# Patient Record
Sex: FEMALE
[De-identification: names, ages, dates, MRNs, and addresses within clinical notes are randomized per-mention and may not be internally consistent; named-entity substitution may affect disease eponyms.]

---

## 2018-05-07 ENCOUNTER — HOSPITAL ENCOUNTER (EMERGENCY)
Dept: HOSPITAL 42 - ED | Age: 23
Discharge: HOME | End: 2018-05-07
Payer: COMMERCIAL

## 2018-05-07 VITALS
RESPIRATION RATE: 18 BRPM | DIASTOLIC BLOOD PRESSURE: 67 MMHG | HEART RATE: 89 BPM | OXYGEN SATURATION: 99 % | SYSTOLIC BLOOD PRESSURE: 128 MMHG

## 2018-05-07 VITALS — TEMPERATURE: 99.5 F

## 2018-05-07 VITALS — BODY MASS INDEX: 33.1 KG/M2

## 2018-05-07 DIAGNOSIS — F17.210: ICD-10-CM

## 2018-05-07 DIAGNOSIS — J02.9: ICD-10-CM

## 2018-05-07 DIAGNOSIS — J45.909: Primary | ICD-10-CM

## 2018-05-07 NOTE — ED PDOC
Arrival/HPI





- General


Chief Complaint: Cough, Cold, Congestion


Time Seen by Provider: 05/07/18 19:06


Historian: Patient





- History of Present Illness


Narrative History of Present Illness (Text): 


05/07/18 19:06


A 22 year old female, whose past medical history includes bronchial asthma, 

presents to the emergency department complaining of nasal congestion, cough, 

and sore throat. Patient reports having ran out of inhaler medication. Patient 

denies any fever, chest pain, or any other complaints. 





No PMD








Past Medical History





- Provider Review


Nursing Documentation Reviewed: Yes





- Travel History


If Yes, travel location?: [place]





- Infectious Disease


Hx of Infectious Diseases: None





- Tetanus Immunization


Tetanus Immunization: Unknown





- Cardiac


Hx Cardiac Disorders: No





- Pulmonary


Hx Respiratory Disorders: Yes


Hx Asthma: Yes


Hx Bronchitis: Yes





- Neurological


Hx Neurological Disorder: No





- HEENT


Hx HEENT Disorder: No





- Renal


Hx Renal Disorder: No





- Endocrine/Metabolic


Hx Endocrine Disorders: No





- Hematological/Oncological


Hx Blood Disorders: No





- Integumentary


Hx Dermatological Disorder: No





- Musculoskeletal/Rheumatological


Hx Musculoskeletal Disorders: No





- Gastrointestinal


Hx Gastrointestinal Disorders: No





- Genitourinary/Gynecological


Hx Genitourinary Disorders: Yes


Other/Comment: bladder infection, chlamydia





- Psychiatric


Hx Psychophysiologic Disorder: No


Hx Substance Use: Yes





- Surgical History


Other/Comment: WISDOM TOOTH EXRACTION





- Anesthesia


Hx Anesthesia: Yes


Hx Anesthesia Reactions: No


Hx Malignant Hyperthermia: No





- Suicidal Assessment


Feels Threatened In Home Enviroment: No





Family/Social History





- Physician Review


Nursing Documentation Reviewed: Yes


Family/Social History: No Known Family HX


Smoking Status: Light Smoker < 10 Cigarettes Daily


Hx Alcohol Use: Yes


Frequency of alcohol use: Socially


Hx Substance Use: Yes


Substance used: MARIJUANA





Allergies/Home Meds


Allergies/Adverse Reactions: 


Allergies





shrimp Allergy (Verified 05/07/18 18:43)


 RASH


seafood Allergy (Uncoded 05/07/18 18:43)


 RASH











Review of Systems





- Physician Review


All systems were reviewed & negative as marked: Yes





- Review of Systems


Constitutional: absent: Fevers


ENT: Sore Throat, Sinus Congestion


Respiratory: Cough


Cardiovascular: absent: Chest Pain





Physical Exam


Vital Signs Reviewed: Yes


Vital Signs











  Temp Pulse Resp Pulse Ox


 


 05/07/18 18:36  99.5 F  75  20  98











Temperature: Afebrile


Pulse: Regular


Respiratory Rate: Normal


Appearance: Positive for: Well-Appearing


Pain Distress: None


Mental Status: Positive for: Alert and Oriented X 3





- Systems Exam


Head: Present: Atraumatic, Normocephalic


Pupils: Present: PERRL


Extroacular Muscles: Present: EOMI


Conjunctiva: Present: Normal


Mouth: Present: Moist Mucous Membranes


Nose (Internal): Present: Other ((+) nasal congestion)


Neck: Present: Normal Range of Motion


Respiratory/Chest: Present: Wheezes (rexpiratory right lung field wheezing)


Cardiovascular: Present: Regular Rate and Rhythm, Normal S1, S2.  No: Murmurs


Abdomen: No: Tenderness, Distention, Peritoneal Signs


Back: Present: Normal Inspection


Upper Extremity: Present: Normal Inspection.  No: Cyanosis, Edema


Lower Extremity: Present: Normal Inspection.  No: Edema, Cyanosis


Neurological: Present: GCS=15, CN II-XII Intact, Speech Normal.  No: Other (no 

focal, motor, or sensory deficits.)


Skin: Present: Warm, Dry, Normal Color.  No: Rashes


Psychiatric: Present: Alert, Oriented x 3, Normal Insight, Normal Concentration





Medical Decision Making


ED Course and Treatment: 


05/07/18 19:11


Impression: 22 year old female with sore throat, cough, and nasal congestion. 





Plan:


-- Duoneb


-- Reassess and disposition





Progress Notes:














- Medication Orders


Current Medication Orders: 











Discontinued Medications





Albuterol/Ipratropium (Duoneb 3 Mg/0.5 Mg (3 Ml) Ud)  3 ml IH ONCE STA


   Stop: 05/07/18 19:12


   Last Admin: 05/07/18 19:53  Dose: 3 ml











- Scribe Statement


The provider has reviewed the documentation as recorded by the Niecy Don





Provider Scribe Attestation:


All medical record entries made by the Scribsimone were at my direction and 

personally dictated by me. I have reviewed the chart and agree that the record 

accurately reflects my personal performance of the history, physical exam, 

medical decision making, and the department course for this patient. I have 

also personally directed, reviewed, and agree with the discharge instructions 

and disposition.








Disposition/Present on Arrival





- Present on Arrival


Any Indicators Present on Arrival: No


History of DVT/PE: No


History of Uncontrolled Diabetes: No


Urinary Catheter: No


History of Decub. Ulcer: No


History Surgical Site Infection Following: None





- Disposition


Have Diagnosis and Disposition been Completed?: Yes


Diagnosis: 


 URI (upper respiratory infection), Asthma, Pharyngitis





Disposition: HOME/ ROUTINE


Disposition Time: 19:40


Patient Plan: Discharge


Patient Problems: 


 Current Active Problems











Problem Status Onset


 


Asthma Acute  


 


Pharyngitis Acute  


 


URI (upper respiratory infection) Acute  











Condition: GOOD


Discharge Instructions (ExitCare):  Asthma, Adult (DC), Sore Throat, Adult (DC)

, Viral Upper Respiratory Infection, Adult (DC)


Additional Instructions: 


Medication as prescribed/rest/drink plenty of liquids/follow up with your 

doctor this week


Prescriptions: 


Fexofenadine/Pseudoephedrine [Allegra-D 12 Hour Tablet] 1 each PO BID PRN #24 

tab.er.12h


 PRN Reason: Nasal Congestion


Amoxicillin [Amoxil 500 mg Cap] 500 mg PO TID #30 cap


Albuterol Sulfate [Proventil Hfa] 2 puff IH Q4 PRN #1 unit


 PRN Reason: Wheezing


Referrals: 


PCP,NO [Primary Care Provider] - Follow up with primary


Forms:  [employer] (English)